# Patient Record
Sex: FEMALE | Race: OTHER | NOT HISPANIC OR LATINO | Employment: PART TIME | URBAN - METROPOLITAN AREA
[De-identification: names, ages, dates, MRNs, and addresses within clinical notes are randomized per-mention and may not be internally consistent; named-entity substitution may affect disease eponyms.]

---

## 2022-05-31 ENCOUNTER — HOSPITAL ENCOUNTER (EMERGENCY)
Facility: HOSPITAL | Age: 22
Discharge: HOME | End: 2022-05-31
Attending: EMERGENCY MEDICINE | Admitting: EMERGENCY MEDICINE
Payer: MEDICARE

## 2022-05-31 VITALS
BODY MASS INDEX: 27.05 KG/M2 | OXYGEN SATURATION: 100 % | HEART RATE: 87 BPM | SYSTOLIC BLOOD PRESSURE: 118 MMHG | RESPIRATION RATE: 16 BRPM | HEIGHT: 62 IN | WEIGHT: 147 LBS | TEMPERATURE: 97.8 F | DIASTOLIC BLOOD PRESSURE: 80 MMHG

## 2022-05-31 DIAGNOSIS — T16.1XXA ACUTE FOREIGN BODY OF RIGHT EARLOBE, INITIAL ENCOUNTER: Primary | ICD-10-CM

## 2022-05-31 PROCEDURE — 99282 EMERGENCY DEPT VISIT SF MDM: CPT

## 2022-05-31 PROCEDURE — 99282 EMERGENCY DEPT VISIT SF MDM: CPT | Performed by: PHYSICIAN ASSISTANT

## 2022-05-31 NOTE — DISCHARGE INSTRUCTIONS
Try to replace with hoop earring soon as possible to keep hole open      Apply antibiotic ointment to area, few times a day

## 2022-05-31 NOTE — ED PROVIDER NOTES
History  Chief Complaint   Patient presents with    Foreign Body in Ear     Pt presents to ed via walk in with her earring stuck in her right ear       No PMH, No PSH  Presents to ED c/o her earring being stuck in right ear lobe since last night  Pt unable to get out on her own  No other complaints, no redness, dc  None       History reviewed  No pertinent past medical history  History reviewed  No pertinent surgical history  History reviewed  No pertinent family history  I have reviewed and agree with the history as documented  E-Cigarette/Vaping    E-Cigarette Use Never User      E-Cigarette/Vaping Substances     Social History     Tobacco Use    Smoking status: Never Smoker    Smokeless tobacco: Never Used   Vaping Use    Vaping Use: Never used   Substance Use Topics    Alcohol use: Never    Drug use: Never       Review of Systems   Constitutional: Negative for fever  HENT: Negative for ear discharge, ear pain and trouble swallowing  Respiratory: Negative for shortness of breath  Gastrointestinal: Negative for nausea  Skin: Negative for wound  Neurological: Negative for numbness  All other systems reviewed and are negative  Physical Exam  Physical Exam  Vitals and nursing note reviewed  Constitutional:       General: She is not in acute distress  Appearance: Normal appearance  She is well-developed  HENT:      Head: Normocephalic and atraumatic  Right Ear: Hearing and external ear normal  A foreign body (earring noted embedded in earlobe, posterior aspect of earring post/backing noted coming from back of earlobe) is present  Left Ear: External ear normal       Nose: Nose normal       Mouth/Throat:      Mouth: Mucous membranes are moist       Pharynx: Oropharynx is clear  Eyes:      Conjunctiva/sclera: Conjunctivae normal    Cardiovascular:      Rate and Rhythm: Normal rate     Pulmonary:      Effort: Pulmonary effort is normal  No respiratory distress  Musculoskeletal:         General: Normal range of motion  Cervical back: Normal range of motion  Neurological:      Mental Status: She is alert  Psychiatric:         Behavior: Behavior normal          Vital Signs  ED Triage Vitals [05/31/22 1411]   Temperature Pulse Respirations Blood Pressure SpO2   97 8 °F (36 6 °C) 87 16 118/80 100 %      Temp Source Heart Rate Source Patient Position - Orthostatic VS BP Location FiO2 (%)   Oral Monitor Sitting Left arm --      Pain Score       No Pain           Vitals:    05/31/22 1411   BP: 118/80   Pulse: 87   Patient Position - Orthostatic VS: Sitting         Visual Acuity      ED Medications  Medications - No data to display    Diagnostic Studies  Results Reviewed     None                 No orders to display              Procedures  Procedures         ED Course                                             MDM  Number of Diagnoses or Management Options  Acute foreign body of right earlobe, initial encounter  Diagnosis management comments: Tried to remove after exam, pt unable to tolerate; 1cc 1% lidocaine used for local anesthesia with complete analgesia, earring easily removed by backing it out, since it was closed to back of earlobe  Area cleaned with saline, bandaid placed  Disposition  Final diagnoses:   Acute foreign body of right earlobe, initial encounter     Time reflects when diagnosis was documented in both MDM as applicable and the Disposition within this note     Time User Action Codes Description Comment    5/31/2022  2:30 PM Sol Donny Gunn[address]  1XXA] Acute foreign body of right earlobe, initial encounter       ED Disposition     ED Disposition   Discharge    Condition   Stable    Date/Time   Tue May 31, 2022  2:29 PM    Comment   Marquis Eaton discharge to home/self care  Follow-up Information    None         There are no discharge medications for this patient  No discharge procedures on file      PDMP Review None          ED Provider  Electronically Signed by           Marty Felix PA-C  05/31/22 8338

## 2022-08-11 ENCOUNTER — OFFICE VISIT (OUTPATIENT)
Dept: FAMILY MEDICINE CLINIC | Facility: CLINIC | Age: 22
End: 2022-08-11
Payer: MEDICARE

## 2022-08-11 VITALS
HEIGHT: 62 IN | DIASTOLIC BLOOD PRESSURE: 68 MMHG | OXYGEN SATURATION: 100 % | WEIGHT: 141 LBS | BODY MASS INDEX: 25.95 KG/M2 | HEART RATE: 86 BPM | RESPIRATION RATE: 16 BRPM | SYSTOLIC BLOOD PRESSURE: 98 MMHG

## 2022-08-11 DIAGNOSIS — D64.9 ANEMIA, UNSPECIFIED TYPE: ICD-10-CM

## 2022-08-11 DIAGNOSIS — R79.89 ELEVATED TSH: Primary | ICD-10-CM

## 2022-08-11 DIAGNOSIS — Z13.6 SCREENING FOR CARDIOVASCULAR CONDITION: ICD-10-CM

## 2022-08-11 PROCEDURE — 99204 OFFICE O/P NEW MOD 45 MIN: CPT | Performed by: FAMILY MEDICINE

## 2022-08-11 NOTE — ASSESSMENT & PLAN NOTE
The past he was anemic due to heavy menstruation, now her periods are normal   Will repeat her labs and will assess her hb

## 2022-08-11 NOTE — PROGRESS NOTES
Assessment/Plan:    Problem List Items Addressed This Visit        Other    Elevated TSH - Primary     In the past she had elevated TSH, will repeat her labs         Relevant Orders    TSH, 3rd generation with Free T4 reflex    Anemia     The past he was anemic due to heavy menstruation, now her periods are normal   Will repeat her labs and will assess her hb         Relevant Orders    CBC and differential    Comprehensive metabolic panel    Screening for cardiovascular condition    Relevant Orders    CBC and differential    Comprehensive metabolic panel    Lipid panel    TSH, 3rd generation with Free T4 reflex          No follow-ups on file  Chief Complaint   Patient presents with    [facility]       Subjective:   Patient ID: Isatu Dias is a 23 y o  female  She is a new patient, she overall feels well, she is here for follow-up she is working on her diet, she denies any fatigue, no chest pain shortness of breath or dizziness,   Her previous labs show elevated TSH and anemia, she says at that time she was having heavy menstruation and she was given the birth control pills to reduce the blood loss and after that she has no normal menstruation without any medication  HPI    Review of Systems   Constitutional: Negative for activity change, appetite change, chills, fatigue, fever and unexpected weight change  HENT: Negative for congestion, ear discharge, ear pain, nosebleeds, postnasal drip, rhinorrhea, sinus pressure, sneezing, sore throat, trouble swallowing and voice change  Eyes: Negative for photophobia, pain, discharge, redness and itching  Respiratory: Negative for cough, chest tightness, shortness of breath and wheezing  Cardiovascular: Negative for chest pain, palpitations and leg swelling  Gastrointestinal: Negative for abdominal pain, constipation, diarrhea, nausea and vomiting  Endocrine: Negative for polyuria  Genitourinary: Negative for dysuria, frequency and urgency  Musculoskeletal: Negative for arthralgias, back pain, myalgias and neck pain  Skin: Negative for color change, pallor and rash  Allergic/Immunologic: Negative for environmental allergies and food allergies  Neurological: Negative for dizziness, weakness, light-headedness and headaches  Hematological: Negative for adenopathy  Does not bruise/bleed easily  Psychiatric/Behavioral: Negative for behavioral problems  The patient is not nervous/anxious  Objective:  Physical Exam  Vitals and nursing note reviewed  Constitutional:       Appearance: Normal appearance  She is well-developed  She is not ill-appearing  HENT:      Head: Normocephalic and atraumatic  Right Ear: External ear normal       Left Ear: External ear normal       Nose: Nose normal       Mouth/Throat:      Mouth: Mucous membranes are moist       Pharynx: No posterior oropharyngeal erythema  Eyes:      General: No scleral icterus  Extraocular Movements: Extraocular movements intact  Conjunctiva/sclera: Conjunctivae normal       Pupils: Pupils are equal, round, and reactive to light  Neck:      Thyroid: No thyromegaly  Cardiovascular:      Rate and Rhythm: Normal rate and regular rhythm  Heart sounds: Normal heart sounds  No murmur heard  Pulmonary:      Effort: Pulmonary effort is normal       Breath sounds: Normal breath sounds  No wheezing or rales  Abdominal:      General: Abdomen is flat  There is no distension  Palpations: There is no mass  Musculoskeletal:      Cervical back: Normal range of motion and neck supple  Right lower leg: No edema  Left lower leg: No edema  Lymphadenopathy:      Cervical: No cervical adenopathy  Skin:     Coloration: Skin is not jaundiced  Findings: No erythema or rash  Neurological:      General: No focal deficit present  Mental Status: She is alert and oriented to person, place, and time     Psychiatric:         Mood and Affect: Mood normal             History reviewed  No pertinent surgical history  History reviewed  No pertinent family history  No current outpatient medications on file      No Known Allergies    Vitals:    08/11/22 1559 08/11/22 1614   BP: 98/68    Pulse: 86    Resp: 16    SpO2: 100%    Weight: 65 8 kg (145 lb) 64 kg (141 lb)   Height: 5' 2" (1 575 m)

## 2022-10-05 ENCOUNTER — OFFICE VISIT (OUTPATIENT)
Dept: FAMILY MEDICINE CLINIC | Facility: CLINIC | Age: 22
End: 2022-10-05
Payer: MEDICARE

## 2022-10-05 VITALS
BODY MASS INDEX: 25.4 KG/M2 | OXYGEN SATURATION: 99 % | HEIGHT: 62 IN | WEIGHT: 138 LBS | HEART RATE: 101 BPM | SYSTOLIC BLOOD PRESSURE: 110 MMHG | RESPIRATION RATE: 16 BRPM | DIASTOLIC BLOOD PRESSURE: 60 MMHG

## 2022-10-05 DIAGNOSIS — D50.9 IRON DEFICIENCY ANEMIA, UNSPECIFIED IRON DEFICIENCY ANEMIA TYPE: ICD-10-CM

## 2022-10-05 DIAGNOSIS — E03.9 HYPOTHYROIDISM, UNSPECIFIED TYPE: ICD-10-CM

## 2022-10-05 DIAGNOSIS — R79.89 ELEVATED TSH: Primary | ICD-10-CM

## 2022-10-05 PROCEDURE — 99214 OFFICE O/P EST MOD 30 MIN: CPT | Performed by: FAMILY MEDICINE

## 2022-10-05 RX ORDER — FERROUS SULFATE TAB EC 324 MG (65 MG FE EQUIVALENT) 324 (65 FE) MG
324 TABLET DELAYED RESPONSE ORAL
Qty: 180 TABLET | Refills: 1 | Status: SHIPPED | OUTPATIENT
Start: 2022-10-05

## 2022-10-05 NOTE — ASSESSMENT & PLAN NOTE
Her labs reviewed in 2020 her TSH was elevated  Now on repeat labs her TSH is still elevated 7, discussed with her that most likely she has to start the medication levothyroxine and I referred her to endocrinologist as this is a long-term medication needs a input from the specialist,

## 2022-10-05 NOTE — PROGRESS NOTES
Name: Thomas Quinteros      : 2000      MRN: [id number]  Encounter Provider: Anderson Guardado MD  Encounter Date: 10/5/2022   Encounter department: [facility]     1  Elevated TSH  -     Ambulatory Referral to Endocrinology; Future    2  Hypothyroidism, unspecified type  Assessment & Plan:  Her labs reviewed in  her TSH was elevated  Now on repeat labs her TSH is still elevated 7, discussed with her that most likely she has to start the medication levothyroxine and I referred her to endocrinologist as this is a long-term medication needs a input from the specialist,    Orders:  -     Ambulatory Referral to Endocrinology; Future    3  Iron deficiency anemia, unspecified iron deficiency anemia type  Assessment & Plan:  She had iron deficiency anemia she has low hemoglobin due to heavy menstruation, discussed with her regarding birth control pills can help to reduce the blood loss, and they have to think about that,   Will start her on iron twice a day and discussed about constipation issue if that happens she needs to take the stool softener  Repeat CBC and iron in 1 month    Orders:  -     ferrous sulfate 324 (65 Fe) mg; Take 1 tablet (324 mg total) by mouth 2 (two) times a day before meals  -     CBC and differential; Future; Expected date: 2022  -     Ferritin; Future; Expected date: 2022      BMI Counseling: Body mass index is 25 24 kg/m²  The BMI is above normal  Nutrition recommendations include consuming healthier snacks  Rationale for BMI follow-up plan is due to patient being overweight or obese  Depression Screening and Follow-up Plan: Patient was screened for depression during today's encounter  They screened negative with a PHQ-2 score of 0          Subjective     She came with her mom, she has history of heavy menstruation, in the past her pediatrician gave her birth control pills for about 6 months and that made her cycle more regular but she still get about 10 days of bleeding, and few days she has heavy menstruation, she feels tired easily, she also had blood work done 2020 with elevated TSH and now she has again blood work done    Review of Systems   Constitutional: Positive for fatigue  Negative for activity change, appetite change, chills, fever and unexpected weight change  HENT: Negative for congestion, ear discharge, ear pain, nosebleeds, postnasal drip, rhinorrhea, sinus pressure, sneezing, sore throat, trouble swallowing and voice change  Eyes: Negative for photophobia, pain, discharge, redness and itching  Respiratory: Negative for cough, chest tightness, shortness of breath and wheezing  Cardiovascular: Negative for chest pain, palpitations and leg swelling  Gastrointestinal: Negative for abdominal pain, constipation, diarrhea, nausea and vomiting  Endocrine: Negative for polyuria  Hair  thinning   Genitourinary: Negative for dysuria, frequency and urgency  Musculoskeletal: Negative for arthralgias, back pain, myalgias and neck pain  Skin: Negative for color change, pallor and rash  Allergic/Immunologic: Negative for environmental allergies and food allergies  Neurological: Negative for dizziness, weakness, light-headedness and headaches  Hematological: Negative for adenopathy  Does not bruise/bleed easily  Psychiatric/Behavioral: Negative for behavioral problems  The patient is not nervous/anxious  History reviewed  No pertinent past medical history  History reviewed  No pertinent surgical history  History reviewed  No pertinent family history    Social History     Socioeconomic History    Marital status: /Civil Union     Spouse name: None    Number of children: None    Years of education: None    Highest education level: None   Occupational History    None   Tobacco Use    Smoking status: Never Smoker    Smokeless tobacco: Never Used   Vaping Use    Vaping Use: Never used Substance and Sexual Activity    Alcohol use: Never    Drug use: Never    Sexual activity: None   Other Topics Concern    None   Social History Narrative    None     Social Determinants of Health     Financial Resource Strain: Not on file   Food Insecurity: Not on file   Transportation Needs: Not on file   Physical Activity: Not on file   Stress: Not on file   Social Connections: Not on file   Intimate Partner Violence: Not on file   Housing Stability: Not on file     No current outpatient medications on file prior to visit  No Known Allergies  Immunization History   Administered Date(s) Administered    DTaP 2002, 09/17/2006    DTaP / HiB 2002, 2002, 01/04/2003, 01/11/2004    Hep A, ped/adol, 2 dose 06/28/2005, 10/29/2006    Hep B, adult 2002, 2002, 01/11/2004    IPV 2002, 2002, 2002, 09/17/2006    MMR 01/11/2004, 09/17/2006    Meningococcal B, OMV (BEXSERO) 07/10/2020, 09/02/2020    Meningococcal MCV4P 07/23/2013, 10/15/2018    Pneumococcal Conjugate PCV 7 2002, 2002, 01/04/2003, 03/23/2004    Tdap 07/23/2013    Varicella 10/11/2004, 07/23/2013       Objective     /60   Pulse 101   Resp 16   Ht 5' 2" (1 575 m)   Wt 62 6 kg (138 lb)   SpO2 99%   BMI 25 24 kg/m²     Physical Exam  Vitals and nursing note reviewed  Constitutional:       Appearance: She is well-developed  HENT:      Head: Normocephalic and atraumatic  Eyes:      General: No scleral icterus  Extraocular Movements: Extraocular movements intact  Conjunctiva/sclera: Conjunctivae normal    Neck:      Thyroid: No thyromegaly  Cardiovascular:      Rate and Rhythm: Normal rate and regular rhythm  Heart sounds: Normal heart sounds  No murmur heard  Pulmonary:      Effort: Pulmonary effort is normal       Breath sounds: Normal breath sounds  No wheezing or rales  Abdominal:      General: Abdomen is flat  There is no distension        Palpations: There is no mass  Tenderness: There is no abdominal tenderness  There is no guarding  Musculoskeletal:      Cervical back: Normal range of motion and neck supple  Lymphadenopathy:      Cervical: No cervical adenopathy  Skin:     Findings: No erythema or rash  Neurological:      General: No focal deficit present  Mental Status: She is alert     Psychiatric:         Mood and Affect: Mood normal        Yoni Farmer MD

## 2022-10-05 NOTE — ASSESSMENT & PLAN NOTE
She had iron deficiency anemia she has low hemoglobin due to heavy menstruation, discussed with her regarding birth control pills can help to reduce the blood loss, and they have to think about that,   Will start her on iron twice a day and discussed about constipation issue if that happens she needs to take the stool softener  Repeat CBC and iron in 1 month

## 2022-10-11 PROBLEM — Z13.6 SCREENING FOR CARDIOVASCULAR CONDITION: Status: RESOLVED | Noted: 2022-08-11 | Resolved: 2022-10-11

## 2022-11-29 ENCOUNTER — APPOINTMENT (OUTPATIENT)
Dept: LAB | Facility: CLINIC | Age: 22
End: 2022-11-29

## 2022-11-29 DIAGNOSIS — D50.9 IRON DEFICIENCY ANEMIA, UNSPECIFIED IRON DEFICIENCY ANEMIA TYPE: ICD-10-CM

## 2022-11-29 LAB
BASOPHILS # BLD AUTO: 0.03 THOUSANDS/ÂΜL (ref 0–0.1)
BASOPHILS NFR BLD AUTO: 1 % (ref 0–1)
EOSINOPHIL # BLD AUTO: 0.14 THOUSAND/ÂΜL (ref 0–0.61)
EOSINOPHIL NFR BLD AUTO: 2 % (ref 0–6)
ERYTHROCYTE [DISTWIDTH] IN BLOOD BY AUTOMATED COUNT: 23.4 % (ref 11.6–15.1)
FERRITIN SERPL-MCNC: 30 NG/ML (ref 8–388)
HCT VFR BLD AUTO: 39.9 % (ref 34.8–46.1)
HGB BLD-MCNC: 12 G/DL (ref 11.5–15.4)
IMM GRANULOCYTES # BLD AUTO: 0.02 THOUSAND/UL (ref 0–0.2)
IMM GRANULOCYTES NFR BLD AUTO: 0 % (ref 0–2)
LYMPHOCYTES # BLD AUTO: 2.15 THOUSANDS/ÂΜL (ref 0.6–4.47)
LYMPHOCYTES NFR BLD AUTO: 34 % (ref 14–44)
MCH RBC QN AUTO: 24.1 PG (ref 26.8–34.3)
MCHC RBC AUTO-ENTMCNC: 30.1 G/DL (ref 31.4–37.4)
MCV RBC AUTO: 80 FL (ref 82–98)
MONOCYTES # BLD AUTO: 0.34 THOUSAND/ÂΜL (ref 0.17–1.22)
MONOCYTES NFR BLD AUTO: 5 % (ref 4–12)
NEUTROPHILS # BLD AUTO: 3.69 THOUSANDS/ÂΜL (ref 1.85–7.62)
NEUTS SEG NFR BLD AUTO: 58 % (ref 43–75)
NRBC BLD AUTO-RTO: 0 /100 WBCS
PLATELET # BLD AUTO: 216 THOUSANDS/UL (ref 149–390)
RBC # BLD AUTO: 4.98 MILLION/UL (ref 3.81–5.12)
WBC # BLD AUTO: 6.37 THOUSAND/UL (ref 4.31–10.16)

## 2022-12-01 ENCOUNTER — OFFICE VISIT (OUTPATIENT)
Dept: FAMILY MEDICINE CLINIC | Facility: CLINIC | Age: 22
End: 2022-12-01

## 2022-12-01 VITALS
RESPIRATION RATE: 16 BRPM | HEIGHT: 62 IN | WEIGHT: 137 LBS | DIASTOLIC BLOOD PRESSURE: 70 MMHG | BODY MASS INDEX: 25.21 KG/M2 | SYSTOLIC BLOOD PRESSURE: 118 MMHG | HEART RATE: 89 BPM | OXYGEN SATURATION: 100 %

## 2022-12-01 DIAGNOSIS — R79.89 ELEVATED TSH: ICD-10-CM

## 2022-12-01 DIAGNOSIS — D50.9 IRON DEFICIENCY ANEMIA, UNSPECIFIED IRON DEFICIENCY ANEMIA TYPE: Primary | ICD-10-CM

## 2022-12-01 PROBLEM — D64.9 ANEMIA: Status: RESOLVED | Noted: 2022-08-11 | Resolved: 2022-12-01

## 2022-12-01 NOTE — ASSESSMENT & PLAN NOTE
She has been taking iron twice a day hemoglobin has improved and she will continue iron supplement twice a day for another few months and then we will repeat her hemoglobin level,  Lab Results   Component Value Date    HCT 39 9 11/29/2022   Her initial hemoglobin was below 9, and now is above 12, her menstrual cycle is normal

## 2022-12-01 NOTE — ASSESSMENT & PLAN NOTE
12/16/22 she has appointment with endocrinologist for further evaluation as previously her TSH has been elevated twice, she does not feel very fatigue since her anemia has improved and she is on iron twice a day since her last visit and her hemoglobin has improved

## 2022-12-01 NOTE — PROGRESS NOTES
Name: Jorge Gómez      : 2000      MRN: [id number]  Encounter Provider: Anitra River MD  Encounter Date: 2022   Encounter department: [facility]     1  Iron deficiency anemia, unspecified iron deficiency anemia type  Assessment & Plan:  She has been taking iron twice a day hemoglobin has improved and she will continue iron supplement twice a day for another few months and then we will repeat her hemoglobin level,  Lab Results   Component Value Date    HCT 39 9 2022   Her initial hemoglobin was below 9, and now is above 12, her menstrual cycle is normal      Orders:  -     CBC and differential; Future; Expected date: 2023    2  Elevated TSH  Assessment & Plan:  22 she has appointment with endocrinologist for further evaluation as previously her TSH has been elevated twice, she does not feel very fatigue since her anemia has improved and she is on iron twice a day since her last visit and her hemoglobin has improved          Depression Screening and Follow-up Plan: Patient was screened for depression during today's encounter  They screened negative with a PHQ-2 score of 0  Subjective     She is here follow-up on her labs for anemia, she had previously persistent elevated TSH and she has made appointment with endocrinologist on ,   She has been taking iron twice a day since last visit and she denies any chest pain palpitation or weakness   Review of Systems   Constitutional: Negative for activity change, appetite change, chills, fatigue, fever and unexpected weight change  HENT: Negative for congestion, ear discharge, ear pain, nosebleeds, postnasal drip, rhinorrhea, sinus pressure, sneezing, sore throat, trouble swallowing and voice change  Eyes: Negative for photophobia, pain, discharge, redness and itching  Respiratory: Negative for cough, chest tightness, shortness of breath and wheezing      Cardiovascular: Negative for chest pain, palpitations and leg swelling  Gastrointestinal: Negative for abdominal pain, constipation, diarrhea, nausea and vomiting  Endocrine: Negative for polyuria  Genitourinary: Negative for dysuria, frequency, menstrual problem and urgency  Musculoskeletal: Negative for arthralgias, back pain, myalgias and neck pain  Skin: Negative for color change, pallor and rash  Allergic/Immunologic: Negative for environmental allergies and food allergies  Neurological: Negative for dizziness, weakness, light-headedness and headaches  Hematological: Negative for adenopathy  Does not bruise/bleed easily  Psychiatric/Behavioral: Negative for behavioral problems and sleep disturbance  The patient is not nervous/anxious  History reviewed  No pertinent past medical history  History reviewed  No pertinent surgical history  History reviewed  No pertinent family history    Social History     Socioeconomic History   • Marital status: /Civil Union     Spouse name: None   • Number of children: None   • Years of education: None   • Highest education level: None   Occupational History   • None   Tobacco Use   • Smoking status: Never   • Smokeless tobacco: Never   Vaping Use   • Vaping Use: Never used   Substance and Sexual Activity   • Alcohol use: Never   • Drug use: Never   • Sexual activity: None   Other Topics Concern   • None   Social History Narrative   • None     Social Determinants of Health     Financial Resource Strain: Not on file   Food Insecurity: Not on file   Transportation Needs: Not on file   Physical Activity: Not on file   Stress: Not on file   Social Connections: Not on file   Intimate Partner Violence: Not on file   Housing Stability: Not on file     Current Outpatient Medications on File Prior to Visit   Medication Sig   • ferrous sulfate 324 (65 Fe) mg Take 1 tablet (324 mg total) by mouth 2 (two) times a day before meals     No Known Allergies  Immunization History Administered Date(s) Administered   • DTaP 2002, 09/17/2006   • DTaP / HiB 2002, 2002, 01/04/2003, 01/11/2004   • Hep A, ped/adol, 2 dose 06/28/2005, 10/29/2006   • Hep B, adult 2002, 2002, 01/11/2004   • IPV 2002, 2002, 2002, 09/17/2006   • MMR 01/11/2004, 09/17/2006   • Meningococcal B, OMV (BEXSERO) 07/10/2020, 09/02/2020   • Meningococcal MCV4P 07/23/2013, 10/15/2018   • Pneumococcal Conjugate PCV 7 2002, 2002, 01/04/2003, 03/23/2004   • Tdap 07/23/2013   • Varicella 10/11/2004, 07/23/2013       Objective     /70   Pulse 89   Resp 16   Ht 5' 2" (1 575 m)   Wt 62 1 kg (137 lb)   SpO2 100%   BMI 25 06 kg/m²     Physical Exam  Vitals and nursing note reviewed  Constitutional:       Appearance: Normal appearance  She is well-developed and well-nourished  She is not ill-appearing  HENT:      Head: Normocephalic and atraumatic  Mouth/Throat:      Mouth: Oropharynx is clear and moist    Eyes:      General: No scleral icterus  Extraocular Movements: Extraocular movements intact and EOM normal       Conjunctiva/sclera: Conjunctivae normal    Neck:      Thyroid: No thyromegaly  Cardiovascular:      Rate and Rhythm: Normal rate and regular rhythm  Heart sounds: Normal heart sounds  Pulmonary:      Effort: Pulmonary effort is normal       Breath sounds: Normal breath sounds  No wheezing or rales  Abdominal:      General: Abdomen is flat  There is no distension  Palpations: There is no mass  Tenderness: There is no abdominal tenderness  Musculoskeletal:      Cervical back: Normal range of motion and neck supple  Lymphadenopathy:      Cervical: No cervical adenopathy  Skin:     Coloration: Skin is not jaundiced  Findings: No erythema or rash  Neurological:      General: No focal deficit present  Mental Status: She is alert     Psychiatric:         Mood and Affect: Mood and affect and mood normal  Anitra River MD

## 2022-12-16 ENCOUNTER — CONSULT (OUTPATIENT)
Dept: ENDOCRINOLOGY | Facility: CLINIC | Age: 22
End: 2022-12-16

## 2022-12-16 ENCOUNTER — LAB (OUTPATIENT)
Dept: LAB | Facility: CLINIC | Age: 22
End: 2022-12-16

## 2022-12-16 VITALS
WEIGHT: 136 LBS | HEART RATE: 91 BPM | DIASTOLIC BLOOD PRESSURE: 72 MMHG | SYSTOLIC BLOOD PRESSURE: 110 MMHG | HEIGHT: 62 IN | BODY MASS INDEX: 25.03 KG/M2

## 2022-12-16 DIAGNOSIS — D50.9 IRON DEFICIENCY ANEMIA, UNSPECIFIED IRON DEFICIENCY ANEMIA TYPE: ICD-10-CM

## 2022-12-16 DIAGNOSIS — R53.83 OTHER FATIGUE: ICD-10-CM

## 2022-12-16 DIAGNOSIS — E03.9 HYPOTHYROIDISM, UNSPECIFIED TYPE: ICD-10-CM

## 2022-12-16 DIAGNOSIS — R79.89 ELEVATED TSH: Primary | ICD-10-CM

## 2022-12-16 DIAGNOSIS — L65.9 HAIR LOSS: ICD-10-CM

## 2022-12-16 LAB
T4 FREE SERPL-MCNC: 0.91 NG/DL (ref 0.78–1.33)
TSH SERPL DL<=0.05 MIU/L-ACNC: 3.39 UIU/ML (ref 0.45–4.5)

## 2022-12-16 NOTE — PROGRESS NOTES
Rafa Mcgarry 23 y o  female MRN: [id number]    Encounter: [id number]      Assessment/Plan     Problem List Items Addressed This Visit        Endocrine    Hypothyroidism    Relevant Orders    T4, free Lab Collect (Completed)    TSH, 3rd generation Lab Collect (Completed)    Thyroid Antibodies Panel Lab Collect (Completed)       Other    Elevated TSH - Primary     We will repeat thyroid function test as well as check TPO and thyroglobulin antibodies  Depending on the results she may be started on thyroid hormone replacement         Hair loss    Iron deficiency anemia    Other fatigue    Relevant Orders    Vitamin D 25 hydroxy Lab Collect (Completed)     CC:   Elevated tsh     History of Present Illness     HPI:  77-year-old female referred for evaluation elevated TSH-she had initially seen her primary care with complaints of  hair loss , haevy menstrual cycles -she was found to have iron deficiency anemia and started on iron supplementations with improvement in her symptoms of fatigue and hair loss  Menstrual cycles have been regular but heavy     No dizziness  , occasional lightheadedness   Used to have ice craving     Lost 9 lbs over the past 3-4 months dietary and lifestyle   Gained 15-20 in the past few years     No constipation   C/o dry skin   No sleep disturbances       Review of Systems    Historical Information   History reviewed  No pertinent past medical history  History reviewed  No pertinent surgical history    Social History   Social History     Substance and Sexual Activity   Alcohol Use Never     Social History     Substance and Sexual Activity   Drug Use Never     Social History     Tobacco Use   Smoking Status Never   Smokeless Tobacco Never     Family History:   Family History   Problem Relation Age of Onset   • Hypertension Mother    • Cancer Maternal Grandmother    • Diabetes type II Maternal Grandfather    • Hypertension Maternal Grandfather    • Diabetes type II Paternal Grandfather Meds/Allergies   Current Outpatient Medications   Medication Sig Dispense Refill   • ferrous sulfate 324 (65 Fe) mg Take 1 tablet (324 mg total) by mouth 2 (two) times a day before meals 180 tablet 1     No current facility-administered medications for this visit  No Known Allergies    Objective   Vitals: Blood pressure 110/72, pulse 91, height 5' 2" (1 575 m), weight 61 7 kg (136 lb)  Physical Exam  Vitals reviewed  Constitutional:       General: She is not in acute distress  Appearance: Normal appearance  She is not ill-appearing, toxic-appearing or diaphoretic  HENT:      Head: Normocephalic and atraumatic  Eyes:      General: No scleral icterus  Extraocular Movements: Extraocular movements intact  Cardiovascular:      Rate and Rhythm: Normal rate and regular rhythm  Heart sounds: Normal heart sounds  No murmur heard  Pulmonary:      Effort: Pulmonary effort is normal  No respiratory distress  Breath sounds: Normal breath sounds  No wheezing or rales  Abdominal:      General: There is no distension  Palpations: Abdomen is soft  Tenderness: There is no abdominal tenderness  There is no guarding  Musculoskeletal:      Cervical back: Neck supple  Right lower leg: No edema  Left lower leg: No edema  Lymphadenopathy:      Cervical: No cervical adenopathy  Skin:     General: Skin is warm and dry  Neurological:      General: No focal deficit present  Mental Status: She is alert and oriented to person, place, and time  Psychiatric:         Mood and Affect: Mood normal          Behavior: Behavior normal          Thought Content: Thought content normal          Judgment: Judgment normal          The history was obtained from the review of the chart, patient and family      Lab Results:   Lab Results   Component Value Date/Time    TSH 3RD [facility] 3 390 12/16/2022 09:41 AM    Free T4 0 91 12/16/2022 09:41 AM     Contains abnormal data TSH, 3RD GENERATION WITH FREE T4 REFLEX  Order: [id number]   Ref Range & Units 9/16/22  7:36 AM   Thyroid Stimulating Hormone 0 36 - 3 74 uIU/mL 7 37 High      T4, FREE  Order: [id number]   Ref Range & Units 9/16/22  7:36 AM   T4, Free 0 76 - 1 46 ng/dL 0 89        Imaging Studies:   Portions of the record may have been created with voice recognition software  Occasional wrong word or "sound a like" substitutions may have occurred due to the inherent limitations of voice recognition software  Read the chart carefully and recognize, using context, where substitutions have occurred

## 2022-12-17 LAB
THYROGLOB AB SERPL-ACNC: <1 IU/ML (ref 0–0.9)
THYROPEROXIDASE AB SERPL-ACNC: 15 IU/ML (ref 0–34)

## 2022-12-18 LAB — 25(OH)D3 SERPL-MCNC: 11.4 NG/ML (ref 30–100)

## 2022-12-19 DIAGNOSIS — E03.9 HYPOTHYROIDISM, UNSPECIFIED TYPE: Primary | ICD-10-CM

## 2022-12-19 DIAGNOSIS — E55.9 VITAMIN D DEFICIENCY: Primary | ICD-10-CM

## 2022-12-19 RX ORDER — ERGOCALCIFEROL 1.25 MG/1
CAPSULE ORAL
Qty: 6 CAPSULE | Refills: 0 | Status: SHIPPED | OUTPATIENT
Start: 2022-12-19

## 2022-12-19 NOTE — RESULT ENCOUNTER NOTE
Please call the patient regarding labs -TSH has improved and thyroid antibodies are negative  For now we will hold off on starting thyroid hormone replacement, repeat TSH and free T4 in 3 months  Vitamin D is very low    Start Drisdol 50,000 international units once a week for 6 weeks and after that take vitamin D3 2000 international units daily

## 2022-12-19 NOTE — ASSESSMENT & PLAN NOTE
We will repeat thyroid function test as well as check TPO and thyroglobulin antibodies    Depending on the results she may be started on thyroid hormone replacement

## 2023-01-11 DIAGNOSIS — D50.9 IRON DEFICIENCY ANEMIA, UNSPECIFIED IRON DEFICIENCY ANEMIA TYPE: ICD-10-CM

## 2023-01-11 RX ORDER — FERROUS SULFATE TAB EC 324 MG (65 MG FE EQUIVALENT) 324 (65 FE) MG
324 TABLET DELAYED RESPONSE ORAL
Qty: 180 TABLET | Refills: 0 | Status: SHIPPED | OUTPATIENT
Start: 2023-01-11

## 2023-03-17 ENCOUNTER — LAB (OUTPATIENT)
Dept: LAB | Facility: CLINIC | Age: 23
End: 2023-03-17

## 2023-03-17 DIAGNOSIS — E03.9 HYPOTHYROIDISM, UNSPECIFIED TYPE: ICD-10-CM

## 2023-03-17 DIAGNOSIS — D50.9 IRON DEFICIENCY ANEMIA, UNSPECIFIED IRON DEFICIENCY ANEMIA TYPE: ICD-10-CM

## 2023-03-17 LAB
BASOPHILS # BLD AUTO: 0.04 THOUSANDS/ÂΜL (ref 0–0.1)
BASOPHILS NFR BLD AUTO: 1 % (ref 0–1)
EOSINOPHIL # BLD AUTO: 0.34 THOUSAND/ÂΜL (ref 0–0.61)
EOSINOPHIL NFR BLD AUTO: 5 % (ref 0–6)
ERYTHROCYTE [DISTWIDTH] IN BLOOD BY AUTOMATED COUNT: 13.4 % (ref 11.6–15.1)
HCT VFR BLD AUTO: 40.9 % (ref 34.8–46.1)
HGB BLD-MCNC: 12.6 G/DL (ref 11.5–15.4)
IMM GRANULOCYTES # BLD AUTO: 0.01 THOUSAND/UL (ref 0–0.2)
IMM GRANULOCYTES NFR BLD AUTO: 0 % (ref 0–2)
LYMPHOCYTES # BLD AUTO: 3.25 THOUSANDS/ÂΜL (ref 0.6–4.47)
LYMPHOCYTES NFR BLD AUTO: 43 % (ref 14–44)
MCH RBC QN AUTO: 27.2 PG (ref 26.8–34.3)
MCHC RBC AUTO-ENTMCNC: 30.8 G/DL (ref 31.4–37.4)
MCV RBC AUTO: 88 FL (ref 82–98)
MONOCYTES # BLD AUTO: 0.41 THOUSAND/ÂΜL (ref 0.17–1.22)
MONOCYTES NFR BLD AUTO: 5 % (ref 4–12)
NEUTROPHILS # BLD AUTO: 3.48 THOUSANDS/ÂΜL (ref 1.85–7.62)
NEUTS SEG NFR BLD AUTO: 46 % (ref 43–75)
NRBC BLD AUTO-RTO: 0 /100 WBCS
PLATELET # BLD AUTO: 286 THOUSANDS/UL (ref 149–390)
PMV BLD AUTO: 11.9 FL (ref 8.9–12.7)
RBC # BLD AUTO: 4.64 MILLION/UL (ref 3.81–5.12)
T4 FREE SERPL-MCNC: 0.86 NG/DL (ref 0.78–1.33)
TSH SERPL DL<=0.05 MIU/L-ACNC: 4.81 UIU/ML (ref 0.45–4.5)
WBC # BLD AUTO: 7.53 THOUSAND/UL (ref 4.31–10.16)

## 2023-03-20 NOTE — RESULT ENCOUNTER NOTE
Please call the patient regarding labs -tsh mildly elevated , start levothyroxine 25 mcg daily and repeat tsh and free t4 in 6 weeks

## 2023-03-22 DIAGNOSIS — E03.9 HYPOTHYROIDISM, UNSPECIFIED TYPE: Primary | ICD-10-CM

## 2023-03-22 DIAGNOSIS — R79.89 ELEVATED TSH: Primary | ICD-10-CM

## 2023-03-22 DIAGNOSIS — E03.9 HYPOTHYROIDISM, UNSPECIFIED TYPE: ICD-10-CM

## 2023-03-22 RX ORDER — LEVOTHYROXINE SODIUM 0.03 MG/1
25 TABLET ORAL DAILY
Qty: 30 TABLET | Refills: 1 | Status: SHIPPED | OUTPATIENT
Start: 2023-03-22

## 2023-03-22 NOTE — TELEPHONE ENCOUNTER
Requested medication(s) are due for refill today: Yes  Patient has already received a courtesy refill: No  Other reason request has been forwarded to provider: New medication

## 2023-04-27 DIAGNOSIS — E03.9 HYPOTHYROIDISM, UNSPECIFIED TYPE: ICD-10-CM

## 2023-04-27 DIAGNOSIS — D50.9 IRON DEFICIENCY ANEMIA, UNSPECIFIED IRON DEFICIENCY ANEMIA TYPE: ICD-10-CM

## 2023-04-27 RX ORDER — LEVOTHYROXINE SODIUM 0.03 MG/1
25 TABLET ORAL DAILY
Qty: 30 TABLET | Refills: 1 | Status: SHIPPED | OUTPATIENT
Start: 2023-04-27

## 2023-04-27 RX ORDER — LEVOTHYROXINE SODIUM 0.03 MG/1
25 TABLET ORAL DAILY
Qty: 30 TABLET | Refills: 0 | OUTPATIENT
Start: 2023-04-27

## 2023-04-27 NOTE — TELEPHONE ENCOUNTER
Pt calling in requesting refill of Levothyroxine  Last seen 12/2022 and moving to a new [place] today, so not scheduling follow up  Ran out of medication a few days ago but her mother takes the same dose, so has been taking hers

## 2023-06-28 DIAGNOSIS — E03.9 HYPOTHYROIDISM, UNSPECIFIED TYPE: ICD-10-CM

## 2023-06-28 RX ORDER — LEVOTHYROXINE SODIUM 0.03 MG/1
TABLET ORAL
Qty: 30 TABLET | Refills: 1 | Status: SHIPPED | OUTPATIENT
Start: 2023-06-28

## 2023-07-21 DIAGNOSIS — D50.9 IRON DEFICIENCY ANEMIA, UNSPECIFIED IRON DEFICIENCY ANEMIA TYPE: ICD-10-CM

## 2023-07-21 RX ORDER — FERROUS SULFATE TAB EC 324 MG (65 MG FE EQUIVALENT) 324 (65 FE) MG
TABLET DELAYED RESPONSE ORAL
Qty: 180 TABLET | Refills: 0 | Status: SHIPPED | OUTPATIENT
Start: 2023-07-21